# Patient Record
(demographics unavailable — no encounter records)

---

## 2024-11-14 NOTE — HISTORY OF PRESENT ILLNESS
[FreeTextEntry1] : DALIA PURVIS is a 61 year M presenting on 11/14/2024 PMH: ED, HTN : Nathaniel [id number]  ED: on Papaverine 8u States that injections not working. Only very minimal effect.  No urinary bother. No nocturia  Family history: denies  reports hx of biopsy x 2 and MRI negative States recent PSA 3.3 with PCP. No results available. PSA 3.77, 6/2024

## 2024-11-14 NOTE — ASSESSMENT
[FreeTextEntry1] : 62yo M ED -UA, Ucx -increase papaverine to 13u -to discuss titration by phone if needed -F/U 6 mo

## 2024-11-14 NOTE — PHYSICAL EXAM
[Normal Appearance] : normal appearance [Well Groomed] : well groomed [General Appearance - In No Acute Distress] : no acute distress [Respiration, Rhythm And Depth] : normal respiratory rhythm and effort [Exaggerated Use Of Accessory Muscles For Inspiration] : no accessory muscle use [Urethral Meatus] : meatus normal [Penis Abnormality] : normal uncircumcised penis [Scrotum] : the scrotum was normal [Testes Tenderness] : no tenderness of the testes [Testes Mass (___cm)] : there were no testicular masses [Prostate Tenderness] : the prostate was not tender [No Prostate Nodules] : no prostate nodules [Normal Station and Gait] : the gait and station were normal for the patient's age [] : no rash [No Focal Deficits] : no focal deficits [Oriented To Time, Place, And Person] : oriented to person, place, and time [Affect] : the affect was normal [Mood] : the mood was normal [de-identified] : B/L 20cc testes,no masses. Prostate soft

## 2025-05-13 NOTE — END OF VISIT
[FreeTextEntry3] : I, Dr. Yanes, personally performed the evaluation and management (E/M) services for this established patient who presents today with (a) new problem(s)/exacerbation of (an) existing condition(s). That E/M includes conducting the clinically appropriate interval history &/or exam, assessing all new/exacerbated conditions, and establishing a new plan of care. Today, [facility], was here to observe my evaluation and management service for this new problem/exacerbated condition and follow the plan of care established by me going forward

## 2025-05-13 NOTE — PHYSICAL EXAM
[Normal Appearance] : normal appearance [Well Groomed] : well groomed [General Appearance - In No Acute Distress] : no acute distress [Respiration, Rhythm And Depth] : normal respiratory rhythm and effort [Exaggerated Use Of Accessory Muscles For Inspiration] : no accessory muscle use [Normal Station and Gait] : the gait and station were normal for the patient's age [] : no rash [No Focal Deficits] : no focal deficits [Oriented To Time, Place, And Person] : oriented to person, place, and time [Affect] : the affect was normal [Mood] : the mood was normal [Urethral Meatus] : meatus normal [Penis Abnormality] : normal uncircumcised penis [Urinary Bladder Findings] : the bladder was normal on palpation [Scrotum] : the scrotum was normal [Epididymis] : the epididymides were normal [Testes Tenderness] : no tenderness of the testes [Testes Mass (___cm)] : there were no testicular masses

## 2025-05-13 NOTE — HISTORY OF PRESENT ILLNESS
[FreeTextEntry1] : DALIA PURVIS is a 62 year M presenting on 05/13/2025 PMH: ED, HTN : Cecilia [id number]  6 mo F/U  No urinary bother. No frequency. No nocturia. No hematuria vague left testis pain feels something hard ED: papaverine 13u Tried 5-6 x but not effective.  reports hx of biopsy x 2 and MRI negative  PSA: 3.77, 6/2024

## 2025-05-13 NOTE — ASSESSMENT
[FreeTextEntry1] : 61yo M ED tetstis pain -UA, Ucx -PSA -trial papaverine 20u -scrotal sonotodsay - small bilateral hydroceles. otherwise benign -F/U 6 mo